# Patient Record
Sex: FEMALE | Race: WHITE
[De-identification: names, ages, dates, MRNs, and addresses within clinical notes are randomized per-mention and may not be internally consistent; named-entity substitution may affect disease eponyms.]

---

## 2022-11-11 ENCOUNTER — HOSPITAL ENCOUNTER (EMERGENCY)
Dept: HOSPITAL 75 - ER | Age: 32
Discharge: HOME | End: 2022-11-11
Payer: COMMERCIAL

## 2022-11-11 VITALS — DIASTOLIC BLOOD PRESSURE: 82 MMHG | SYSTOLIC BLOOD PRESSURE: 121 MMHG

## 2022-11-11 DIAGNOSIS — F17.210: ICD-10-CM

## 2022-11-11 DIAGNOSIS — Z20.822: ICD-10-CM

## 2022-11-11 DIAGNOSIS — J40: Primary | ICD-10-CM

## 2022-11-11 DIAGNOSIS — Z28.310: ICD-10-CM

## 2022-11-11 PROCEDURE — 99285 EMERGENCY DEPT VISIT HI MDM: CPT

## 2022-11-11 PROCEDURE — 87636 SARSCOV2 & INF A&B AMP PRB: CPT

## 2022-11-11 RX ADMIN — FLUTICASONE PROPIONATE AND SALMETEROL ONE EACH: 113; 14 POWDER, METERED RESPIRATORY (INHALATION) at 03:18

## 2022-11-11 RX ADMIN — FLUTICASONE PROPIONATE AND SALMETEROL ONE EACH: 113; 14 POWDER, METERED RESPIRATORY (INHALATION) at 03:20

## 2022-11-11 NOTE — ED COUGH/URI
General


Chief Complaint:  Respiratory Problems


Stated Complaint:  SOA - COUGH - CONGESTION


Source:  patient





History of Present Illness


Date Seen by Provider:  Nov 11, 2022


Time Seen by Provider:  02:26


Initial Comments


PT ARRIVES VIA POV FROM HOME, WITH HER CHILD 


HAS BEEN SICK FOR THE LAST FEW DAYS WITH COUGH AND CONGESTION


HAS HAD FEVER UP .4 THE LAST 2 DAYS


SEEN AT [facility] ON TUESDAY AND TESTED NEGATIVE FOR COVID


WENT BACK TODAY FOR CONTINUED SYMPTOMS, CXR WAS DONE AND REPORTED AS NEGATIVE, 

PER PT


SHE WAS GIVEN RX FOR TESSALON AND PREDNISONE


TOOK PREDNISONE AS DIRECTED TODAY, AND LAST DOSE OF TESSALON WAS AT 2200 

TONIGHT. 


30 MINUTES AGO SHE BEGAN COUGHING AND FEELING LIKE SHE CAN'T CATCH HER BREATH 

BECAUSE SHE IS COUGHING SO MUCH. 





NO KNOWN SICK CONTACTS. 


PT IS A SMOKER





PCP: [facility]





Allergies and Home Medications


Allergies


Coded Allergies:  


     No Known Drug Allergies (Unverified , 11/11/22)





Patient Home Medication List


Doxycycline Hyclate (Doxycycline Hyclate) 100 Mg Tablet, 100 MG PO BID


   Prescribed by: SHARRI ERIC on 11/11/22 0307


Promethazine/Dextromethorphan (Promethazine-Dm Syrup) 6.25 Mg-15 Mg/5 Ml Syrup, 

5 ML PO Q4H


   Prescribed by: SHARRI ERIC on 11/11/22 0307





Review of Systems


Review of Systems


Constitutional:  see HPI, fever


EENTM:  nose congestion, throat pain


Respiratory:  see HPI, cough, short of breath


Cardiovascular:  no symptoms reported


Gastrointestinal:  no symptoms reported


Genitourinary:  no symptoms reported


Musculoskeletal:  no symptoms reported


Skin:  no symptoms reported


Psychiatric/Neurological:  Anxiety





Past Medical-Social-Family Hx


Patient Social History


Tobacco Use?:  Yes


Tobacco type used:  Cigarettes


Smoking Status:  Current Someday Smoker


Substance use?:  No


Alcohol Use?:  No





Immunizations Up To Date


Influenza Vaccine Up-to-Date:  No; Not Current





Past Medical History


Surgeries:  No


Respiratory:  No


Cardiac:  No


Neurological:  No


Genitourinary:  No


Gastrointestinal:  No


Musculoskeletal:  No


Endocrine:  No


HEENT:  No


Cancer:  No


Psychosocial:  No


Integumentary:  No


Blood Disorders:  No





Physical Exam





Vital Signs - First Documented








 11/11/22





 02:23


 


Temp 36.9


 


Pulse 102


 


Resp 22


 


B/P (MAP) 127/83 (98)


 


Pulse Ox 98


 


O2 Delivery Room Air





Capillary Refill :


Height: '"


Weight: lbs. oz. kg;  BMI


Method:


General Appearance:  WD/WN, other (REEKS OF CIGARETTES; CONSTANT HARSH, FORCED 

COUGHING)


Neck:  normal inspection


Respiratory:  normal breath sounds, no respiratory distress, no accessory muscle

use


Cardiovascular:  regular rate, rhythm


Gastrointestinal:  soft


Extremities:  normal inspection, no pedal edema, normal capillary refill


Neurologic/Psychiatric:  no motor/sensory deficits, alert, oriented x 3


Skin:  normal color, warm/dry





Progress/Results/Core Measures


Suspected Sepsis


SIRS


Temperature: 


Pulse:  


Respiratory Rate: 


 


Blood Pressure  / 


Mean:





Results/Orders


Lab Results





Laboratory Tests








Test


 11/11/22


02:28 Range/Units


 


 


Influenza Type A (RT-PCR) Not Detected  Not Detecte  


 


Influenza Type B (RT-PCR) Not Detected  Not Detecte  


 


SARS-CoV-2 RNA (RT-PCR) Not Detected  Not Detecte  








My Orders





Orders - SHARRI ERIC DO


Covid 19 Inhouse Test (11/11/22 02:26)


Influenza A And B By Pcr (11/11/22 02:26)


Isolation Central Supply Req (11/11/22 02:26)


Rx-Albuterol Inhaler (Rx-Ventolin Hfa In (11/11/22 03:08)


Fluticasone/Salmeterol 232-14 (Airduo Re (11/11/22 08:00)


Rx-Doxycycline Tablet (Rx-Vibramycin Tab (11/11/22 03:09)


Promethazine/ Codeine Syrup (Phenergan W (11/11/22 03:11)


Fluticasone/Salmeterol 113-14 (Airduo Re (11/11/22 03:14)


Rt Request For Service (11/11/22 03:28)





Medications Given in ED





Current Medications








 Medications  Dose


 Ordered  Sig/Argenis


 Route  Start Time


 Stop Time Status Last Admin


Dose Admin


 


 Promethazine HCl/


 Codeine  5 ml  STK-MED ONCE


 .ROUTE  11/11/22 03:11


 11/11/22 03:13 DC 11/11/22 03:21


5 ML


 


 Salmeterol


 Xinafoate/


 Fluticasone  1 each  STK-MED ONCE


 IH  11/11/22 03:14


 11/11/22 03:16 DC 11/11/22 03:20


1 EACH








Vital Signs/I&O











 11/11/22 11/11/22





 02:23 02:23


 


Temp  36.9


 


Pulse  102


 


Resp  22


 


B/P (MAP)  127/83 (98)


 


Pulse Ox  98


 


O2 Delivery Room Air Room Air





Capillary Refill :


Progress Note :  


Progress Note





PLACED IN ISOLATION ROOM


PPE WORN


COVID AND FLU TESTING DONE


PT DECLINES CXR, AS SHE JUST HAD ONE TODAY AT [facility] AND WAS REPORTED TO HER AS

NORMAL





O2 SATS 98% ON ROOM AIR


NO FEVER





COUGH SUBSIDED WHEN STAFF LEFT ROOM


PT NOTED TO BE PLAYING/TEXTING ON HER PHONE AND NOT COUGHING, UNTIL STAFF WALKED

INTO ROOM AND SHE BEGAN HAVING CONSTANT HARSH FORCED COUGHING AGAIN. 





WILL ADD ANTIBIOTIC, INHALERS AND LIQUID COUGH SYRUP 


RT DID SPACER AND INHALER TEACHING





ANTICIPATED COURSE, SYMPTOMATIC TREATMENT, AND NEED FOR FOLLOW UP, AS WELL AS 

RETURN PRECAUTIONS DISCUSSED.





Departure


Impression





   Primary Impression:  


   Bronchitis


Disposition:  01 HOME, SELF-CARE


Condition:  Stable





Departure-Patient Inst.


Decision time for Depature:  03:06


Referrals:  


[facility]


Patient Instructions:  Bronchitis, Adult ED





Add. Discharge Instructions:  


CONTINUE PREDNISONE AND TESSALON AS PRESCRIBED


YOU MAY TAKE TESSALON 200 MG EVERY 8 HOURS





TYLENOL AND MOTRIN AS NEEDED FOR PAIN OR FEVER





LOTS OF CLEAR LIQUIDS





FOLLOW UP WITH [facility] IN 2-3 DAYS IF NO BETTER , RETURN TO ER IF SYMPTOMS 

WORSEN





All discharge instructions reviewed with patient and/or family. Voiced 

understanding.


Scripts


Promethazine/Dextromethorphan (Promethazine-Dm Syrup) 6.25 Mg-15 Mg/5 Ml Syrup


5 ML PO Q4H for Cough, #200 ML


   Prov: SHARRI ERIC DO         11/11/22 


Doxycycline Hyclate (Doxycycline Hyclate) 100 Mg Tablet


100 MG PO BID, #20 TAB 0 Refills


   Prov: SHARRI ERIC DO         11/11/22


Work/School Note:  Work Release Form   Date Seen in the Emergency Department:  

Nov 11, 2022


   Return to Work:  Nov 14, 2022











SHARRI ERIC DO                 Nov 11, 2022 02:39